# Patient Record
Sex: MALE | Race: WHITE | Employment: OTHER | URBAN - METROPOLITAN AREA
[De-identification: names, ages, dates, MRNs, and addresses within clinical notes are randomized per-mention and may not be internally consistent; named-entity substitution may affect disease eponyms.]

---

## 2017-03-17 ENCOUNTER — HOSPITAL ENCOUNTER (OUTPATIENT)
Dept: SURGERY | Age: 67
Discharge: HOME | End: 2017-03-17
Payer: MEDICARE

## 2017-03-17 VITALS
TEMPERATURE: 98.2 F | BODY MASS INDEX: 28.97 KG/M2 | RESPIRATION RATE: 19 BRPM | HEART RATE: 85 BPM | WEIGHT: 173.9 LBS | HEIGHT: 65 IN | SYSTOLIC BLOOD PRESSURE: 147 MMHG | DIASTOLIC BLOOD PRESSURE: 89 MMHG | OXYGEN SATURATION: 97 %

## 2017-03-17 LAB — HGB BLD-MCNC: 15.9 G/DL (ref 13.6–17.2)

## 2017-03-17 PROCEDURE — 85018 HEMOGLOBIN: CPT | Performed by: ANESTHESIOLOGY

## 2017-03-17 NOTE — PERIOP NOTES
Recent Results (from the past 12 hour(s))   HEMOGLOBIN    Collection Time: 03/17/17  1:00 PM   Result Value Ref Range    HGB 15.9 13.6 - 17.2 g/dL    reviewed

## 2017-03-17 NOTE — PERIOP NOTES
Patient verified name, , and surgery as listed in [facility]. TYPE  CASE:2            Orders: received. Labs per surgeon:  None ordered    Labs per anesthesia protocol: HGB. T/S Order signed and held for PREOP   EKG  :  None needed     BLOOD CONSENT ON CHART     Pt medications obtained  PT MEMORY, med bottles NOT visualized. Requested pt to validate each medication, dose and frequency while here today. Instructed patient to continue previous medications as prescribed prior to surgery and  to take the following medications the day of surgery according to anesthesia guidelines with a small sip of water : FLONASE IF NEEDED        Continue all previous medications unless otherwise directed. Instructed patient to hold  the following medications prior to surgery: NONE    Patient instructed on the following and verbalized understanding:  Arrive at [facility] entrance, time of arrival to be called the day before by 1700. Responsible adult must drive patient to and from hospital, stay during surgery and 24 hours postoperatively. Npo after midnight including gum, mints and ice chips. Use hibiclens in the shower the night before and the morning of surgery. Leave all valuables at home. Instructed on no jewelry or body piercings on the dos. Bring insurance card and ID. No perfumes, oil, powder, colognes, makeup or  lotions on the skin. Patient verbalized understanding of all instructions and provided all medical/health information to the best of their ability.

## 2017-03-21 ENCOUNTER — ANESTHESIA EVENT (OUTPATIENT)
Dept: SURGERY | Age: 67
End: 2017-03-21
Payer: MEDICARE

## 2017-03-22 ENCOUNTER — SURGERY (OUTPATIENT)
Age: 67
End: 2017-03-22

## 2017-03-22 ENCOUNTER — HOSPITAL ENCOUNTER (OUTPATIENT)
Age: 67
Discharge: HOME | End: 2017-03-26
Attending: UROLOGY | Admitting: UROLOGY
Payer: MEDICARE

## 2017-03-22 ENCOUNTER — ANESTHESIA (OUTPATIENT)
Dept: SURGERY | Age: 67
End: 2017-03-22
Payer: MEDICARE

## 2017-03-22 PROBLEM — N40.0 BPH (BENIGN PROSTATIC HYPERPLASIA): Status: ACTIVE | Noted: 2017-03-22

## 2017-03-22 LAB
ABO + RH BLD: NORMAL
BLOOD GROUP ANTIBODIES SERPL: NORMAL
SPECIMEN EXP DATE BLD: NORMAL

## 2017-03-22 PROCEDURE — 77030010545: Performed by: UROLOGY

## 2017-03-22 PROCEDURE — 77030005546 HC CATH URETH FOL 3W BARD -A: Performed by: UROLOGY

## 2017-03-22 PROCEDURE — 88305 TISSUE EXAM BY PATHOLOGIST: CPT | Performed by: UROLOGY

## 2017-03-22 PROCEDURE — 74011250636 HC RX REV CODE- 250/636

## 2017-03-22 PROCEDURE — 74011250637 HC RX REV CODE- 250/637: Performed by: ANESTHESIOLOGY

## 2017-03-22 PROCEDURE — 77030019940 HC BLNKT HYPOTHRM STRY -B: Performed by: ANESTHESIOLOGY

## 2017-03-22 PROCEDURE — 74011250636 HC RX REV CODE- 250/636: Performed by: ANESTHESIOLOGY

## 2017-03-22 PROCEDURE — 65270000029 HC RM PRIVATE

## 2017-03-22 PROCEDURE — 77030022427 HC ELECTRD RESCTCS CT STOR -C: Performed by: UROLOGY

## 2017-03-22 PROCEDURE — 77030005206: Performed by: UROLOGY

## 2017-03-22 PROCEDURE — 76060000034 HC ANESTHESIA 1.5 TO 2 HR: Performed by: UROLOGY

## 2017-03-22 PROCEDURE — 77030018836 HC SOL IRR NACL ICUM -A

## 2017-03-22 PROCEDURE — 74011250637 HC RX REV CODE- 250/637: Performed by: UROLOGY

## 2017-03-22 PROCEDURE — 82355 CALCULUS ANALYSIS QUAL: CPT | Performed by: UROLOGY

## 2017-03-22 PROCEDURE — 77030018846 HC SOL IRR STRL H20 ICUM -A: Performed by: UROLOGY

## 2017-03-22 PROCEDURE — 77030019949 HC FBR LSR HOLM OCOA -G: Performed by: UROLOGY

## 2017-03-22 PROCEDURE — 76210000016 HC OR PH I REC 1 TO 1.5 HR: Performed by: UROLOGY

## 2017-03-22 PROCEDURE — 77030018830 HC SOL IRR GLYC ICUM-A: Performed by: UROLOGY

## 2017-03-22 PROCEDURE — 77030019927 HC TBNG IRR CYSTO BAXT -A: Performed by: UROLOGY

## 2017-03-22 PROCEDURE — 74011250636 HC RX REV CODE- 250/636: Performed by: UROLOGY

## 2017-03-22 PROCEDURE — 77030020143 HC AIRWY LARYN INTUB CGAS -A: Performed by: ANESTHESIOLOGY

## 2017-03-22 PROCEDURE — 86850 RBC ANTIBODY SCREEN: CPT | Performed by: ANESTHESIOLOGY

## 2017-03-22 PROCEDURE — 76010000162 HC OR TIME 1.5 TO 2 HR INTENSV-TIER 1: Performed by: UROLOGY

## 2017-03-22 PROCEDURE — 74011000258 HC RX REV CODE- 258: Performed by: UROLOGY

## 2017-03-22 PROCEDURE — 74011000250 HC RX REV CODE- 250

## 2017-03-22 PROCEDURE — 77030011640 HC PAD GRND REM COVD -A: Performed by: UROLOGY

## 2017-03-22 PROCEDURE — 77030032490 HC SLV COMPR SCD KNE COVD -B: Performed by: UROLOGY

## 2017-03-22 RX ORDER — ZOLPIDEM TARTRATE 5 MG/1
5 TABLET ORAL
Status: DISCONTINUED | OUTPATIENT
Start: 2017-03-22 | End: 2017-03-26 | Stop reason: HOSPADM

## 2017-03-22 RX ORDER — LORAZEPAM 2 MG/ML
1 INJECTION INTRAMUSCULAR
Status: DISCONTINUED | OUTPATIENT
Start: 2017-03-22 | End: 2017-03-26 | Stop reason: HOSPADM

## 2017-03-22 RX ORDER — DEXAMETHASONE SODIUM PHOSPHATE 4 MG/ML
INJECTION, SOLUTION INTRA-ARTICULAR; INTRALESIONAL; INTRAMUSCULAR; INTRAVENOUS; SOFT TISSUE AS NEEDED
Status: DISCONTINUED | OUTPATIENT
Start: 2017-03-22 | End: 2017-03-22 | Stop reason: HOSPADM

## 2017-03-22 RX ORDER — PROPOFOL 10 MG/ML
INJECTION, EMULSION INTRAVENOUS AS NEEDED
Status: DISCONTINUED | OUTPATIENT
Start: 2017-03-22 | End: 2017-03-22 | Stop reason: HOSPADM

## 2017-03-22 RX ORDER — FENTANYL CITRATE 50 UG/ML
100 INJECTION, SOLUTION INTRAMUSCULAR; INTRAVENOUS ONCE
Status: DISCONTINUED | OUTPATIENT
Start: 2017-03-22 | End: 2017-03-22 | Stop reason: HOSPADM

## 2017-03-22 RX ORDER — POTASSIUM CHLORIDE 750 MG/1
20 TABLET, EXTENDED RELEASE ORAL 2 TIMES DAILY
Status: DISCONTINUED | OUTPATIENT
Start: 2017-03-22 | End: 2017-03-26 | Stop reason: HOSPADM

## 2017-03-22 RX ORDER — ACETAMINOPHEN 325 MG/1
650 TABLET ORAL
Status: DISCONTINUED | OUTPATIENT
Start: 2017-03-22 | End: 2017-03-26 | Stop reason: HOSPADM

## 2017-03-22 RX ORDER — MIDAZOLAM HYDROCHLORIDE 1 MG/ML
2 INJECTION, SOLUTION INTRAMUSCULAR; INTRAVENOUS
Status: DISCONTINUED | OUTPATIENT
Start: 2017-03-22 | End: 2017-03-22 | Stop reason: HOSPADM

## 2017-03-22 RX ORDER — LIDOCAINE HYDROCHLORIDE 20 MG/ML
INJECTION, SOLUTION EPIDURAL; INFILTRATION; INTRACAUDAL; PERINEURAL AS NEEDED
Status: DISCONTINUED | OUTPATIENT
Start: 2017-03-22 | End: 2017-03-22 | Stop reason: HOSPADM

## 2017-03-22 RX ORDER — SODIUM CHLORIDE, SODIUM LACTATE, POTASSIUM CHLORIDE, CALCIUM CHLORIDE 600; 310; 30; 20 MG/100ML; MG/100ML; MG/100ML; MG/100ML
75 INJECTION, SOLUTION INTRAVENOUS CONTINUOUS
Status: DISCONTINUED | OUTPATIENT
Start: 2017-03-22 | End: 2017-03-25 | Stop reason: HOSPADM

## 2017-03-22 RX ORDER — FINASTERIDE 5 MG/1
5 TABLET, FILM COATED ORAL DAILY
Status: DISCONTINUED | OUTPATIENT
Start: 2017-03-23 | End: 2017-03-26 | Stop reason: HOSPADM

## 2017-03-22 RX ORDER — HYDROMORPHONE HYDROCHLORIDE 1 MG/ML
1 INJECTION, SOLUTION INTRAMUSCULAR; INTRAVENOUS; SUBCUTANEOUS
Status: DISCONTINUED | OUTPATIENT
Start: 2017-03-22 | End: 2017-03-26 | Stop reason: HOSPADM

## 2017-03-22 RX ORDER — SODIUM CHLORIDE 0.9 % (FLUSH) 0.9 %
5-10 SYRINGE (ML) INJECTION EVERY 8 HOURS
Status: DISCONTINUED | OUTPATIENT
Start: 2017-03-22 | End: 2017-03-26 | Stop reason: HOSPADM

## 2017-03-22 RX ORDER — FENTANYL CITRATE 50 UG/ML
INJECTION, SOLUTION INTRAMUSCULAR; INTRAVENOUS AS NEEDED
Status: DISCONTINUED | OUTPATIENT
Start: 2017-03-22 | End: 2017-03-22 | Stop reason: HOSPADM

## 2017-03-22 RX ORDER — SODIUM CHLORIDE 0.9 % (FLUSH) 0.9 %
5-10 SYRINGE (ML) INJECTION AS NEEDED
Status: DISCONTINUED | OUTPATIENT
Start: 2017-03-22 | End: 2017-03-26 | Stop reason: HOSPADM

## 2017-03-22 RX ORDER — OXYCODONE HYDROCHLORIDE 5 MG/1
5 TABLET ORAL
Status: DISCONTINUED | OUTPATIENT
Start: 2017-03-22 | End: 2017-03-25 | Stop reason: HOSPADM

## 2017-03-22 RX ORDER — HYDROCODONE BITARTRATE AND ACETAMINOPHEN 5; 325 MG/1; MG/1
2 TABLET ORAL AS NEEDED
Status: DISCONTINUED | OUTPATIENT
Start: 2017-03-22 | End: 2017-03-25 | Stop reason: ALTCHOICE

## 2017-03-22 RX ORDER — DEXTROSE MONOHYDRATE AND SODIUM CHLORIDE 5; .45 G/100ML; G/100ML
100 INJECTION, SOLUTION INTRAVENOUS CONTINUOUS
Status: DISCONTINUED | OUTPATIENT
Start: 2017-03-22 | End: 2017-03-23

## 2017-03-22 RX ORDER — NALOXONE HYDROCHLORIDE 0.4 MG/ML
0.4 INJECTION, SOLUTION INTRAMUSCULAR; INTRAVENOUS; SUBCUTANEOUS
Status: DISCONTINUED | OUTPATIENT
Start: 2017-03-22 | End: 2017-03-26 | Stop reason: HOSPADM

## 2017-03-22 RX ORDER — HYDROCODONE BITARTRATE AND ACETAMINOPHEN 10; 325 MG/1; MG/1
1 TABLET ORAL
Status: DISCONTINUED | OUTPATIENT
Start: 2017-03-22 | End: 2017-03-26 | Stop reason: HOSPADM

## 2017-03-22 RX ORDER — FAMOTIDINE 20 MG/1
20 TABLET, FILM COATED ORAL ONCE
Status: COMPLETED | OUTPATIENT
Start: 2017-03-22 | End: 2017-03-22

## 2017-03-22 RX ORDER — HYDROMORPHONE HYDROCHLORIDE 2 MG/ML
0.5 INJECTION, SOLUTION INTRAMUSCULAR; INTRAVENOUS; SUBCUTANEOUS
Status: DISCONTINUED | OUTPATIENT
Start: 2017-03-22 | End: 2017-03-25 | Stop reason: HOSPADM

## 2017-03-22 RX ORDER — ONDANSETRON 2 MG/ML
INJECTION INTRAMUSCULAR; INTRAVENOUS AS NEEDED
Status: DISCONTINUED | OUTPATIENT
Start: 2017-03-22 | End: 2017-03-22 | Stop reason: HOSPADM

## 2017-03-22 RX ORDER — ATROPA BELLADONNA AND OPIUM 16.2; 6 MG/1; MG/1
1 SUPPOSITORY RECTAL
Status: DISCONTINUED | OUTPATIENT
Start: 2017-03-22 | End: 2017-03-26 | Stop reason: HOSPADM

## 2017-03-22 RX ORDER — CITALOPRAM 20 MG/1
20 TABLET, FILM COATED ORAL EVERY EVENING
Status: DISCONTINUED | OUTPATIENT
Start: 2017-03-22 | End: 2017-03-26 | Stop reason: HOSPADM

## 2017-03-22 RX ORDER — DIPHENHYDRAMINE HYDROCHLORIDE 50 MG/ML
12.5 INJECTION, SOLUTION INTRAMUSCULAR; INTRAVENOUS
Status: DISCONTINUED | OUTPATIENT
Start: 2017-03-22 | End: 2017-03-26 | Stop reason: HOSPADM

## 2017-03-22 RX ORDER — CIPROFLOXACIN 500 MG/1
500 TABLET ORAL EVERY 12 HOURS
Status: DISCONTINUED | OUTPATIENT
Start: 2017-03-22 | End: 2017-03-26 | Stop reason: HOSPADM

## 2017-03-22 RX ORDER — LIDOCAINE HYDROCHLORIDE 10 MG/ML
0.1 INJECTION INFILTRATION; PERINEURAL AS NEEDED
Status: DISCONTINUED | OUTPATIENT
Start: 2017-03-22 | End: 2017-03-22 | Stop reason: HOSPADM

## 2017-03-22 RX ORDER — CEFAZOLIN SODIUM IN 0.9 % NACL 2 G/50 ML
2 INTRAVENOUS SOLUTION, PIGGYBACK (ML) INTRAVENOUS ONCE
Status: COMPLETED | OUTPATIENT
Start: 2017-03-22 | End: 2017-03-22

## 2017-03-22 RX ORDER — FLUTICASONE PROPIONATE 50 MCG
2 SPRAY, SUSPENSION (ML) NASAL DAILY
Status: DISCONTINUED | OUTPATIENT
Start: 2017-03-23 | End: 2017-03-26 | Stop reason: HOSPADM

## 2017-03-22 RX ORDER — MIDAZOLAM HYDROCHLORIDE 1 MG/ML
5 INJECTION, SOLUTION INTRAMUSCULAR; INTRAVENOUS ONCE
Status: DISCONTINUED | OUTPATIENT
Start: 2017-03-22 | End: 2017-03-22 | Stop reason: HOSPADM

## 2017-03-22 RX ORDER — ONDANSETRON 2 MG/ML
4 INJECTION INTRAMUSCULAR; INTRAVENOUS
Status: DISCONTINUED | OUTPATIENT
Start: 2017-03-22 | End: 2017-03-26 | Stop reason: HOSPADM

## 2017-03-22 RX ORDER — ATORVASTATIN CALCIUM 10 MG/1
10 TABLET, FILM COATED ORAL
Status: DISCONTINUED | OUTPATIENT
Start: 2017-03-22 | End: 2017-03-26 | Stop reason: HOSPADM

## 2017-03-22 RX ORDER — SODIUM CHLORIDE, SODIUM LACTATE, POTASSIUM CHLORIDE, CALCIUM CHLORIDE 600; 310; 30; 20 MG/100ML; MG/100ML; MG/100ML; MG/100ML
75 INJECTION, SOLUTION INTRAVENOUS CONTINUOUS
Status: DISCONTINUED | OUTPATIENT
Start: 2017-03-22 | End: 2017-03-22 | Stop reason: HOSPADM

## 2017-03-22 RX ADMIN — HYDROMORPHONE HYDROCHLORIDE 0.5 MG: 2 INJECTION, SOLUTION INTRAMUSCULAR; INTRAVENOUS; SUBCUTANEOUS at 16:00

## 2017-03-22 RX ADMIN — DEXTROSE MONOHYDRATE AND SODIUM CHLORIDE 100 ML/HR: 5; .45 INJECTION, SOLUTION INTRAVENOUS at 17:23

## 2017-03-22 RX ADMIN — HYDROCODONE BITARTRATE AND ACETAMINOPHEN 2 TABLET: 5; 325 TABLET ORAL at 17:26

## 2017-03-22 RX ADMIN — POTASSIUM CHLORIDE 20 MEQ: 10 TABLET, EXTENDED RELEASE ORAL at 17:25

## 2017-03-22 RX ADMIN — FAMOTIDINE 20 MG: 20 TABLET, FILM COATED ORAL at 12:20

## 2017-03-22 RX ADMIN — MIDAZOLAM HYDROCHLORIDE 2 MG: 1 INJECTION, SOLUTION INTRAMUSCULAR; INTRAVENOUS at 12:43

## 2017-03-22 RX ADMIN — LIDOCAINE HYDROCHLORIDE 100 MG: 20 INJECTION, SOLUTION EPIDURAL; INFILTRATION; INTRACAUDAL; PERINEURAL at 13:37

## 2017-03-22 RX ADMIN — PROPOFOL 200 MG: 10 INJECTION, EMULSION INTRAVENOUS at 13:37

## 2017-03-22 RX ADMIN — DEXAMETHASONE SODIUM PHOSPHATE 4 MG: 4 INJECTION, SOLUTION INTRA-ARTICULAR; INTRALESIONAL; INTRAMUSCULAR; INTRAVENOUS; SOFT TISSUE at 14:04

## 2017-03-22 RX ADMIN — ONDANSETRON 4 MG: 2 INJECTION INTRAMUSCULAR; INTRAVENOUS at 14:06

## 2017-03-22 RX ADMIN — HYDROMORPHONE HYDROCHLORIDE 0.5 MG: 2 INJECTION, SOLUTION INTRAMUSCULAR; INTRAVENOUS; SUBCUTANEOUS at 15:53

## 2017-03-22 RX ADMIN — FENTANYL CITRATE 50 MCG: 50 INJECTION, SOLUTION INTRAMUSCULAR; INTRAVENOUS at 14:11

## 2017-03-22 RX ADMIN — FENTANYL CITRATE 50 MCG: 50 INJECTION, SOLUTION INTRAMUSCULAR; INTRAVENOUS at 13:35

## 2017-03-22 RX ADMIN — SODIUM CHLORIDE, SODIUM LACTATE, POTASSIUM CHLORIDE, AND CALCIUM CHLORIDE 75 ML/HR: 600; 310; 30; 20 INJECTION, SOLUTION INTRAVENOUS at 12:18

## 2017-03-22 RX ADMIN — CEFAZOLIN 2 G: 1 INJECTION, POWDER, FOR SOLUTION INTRAMUSCULAR; INTRAVENOUS; PARENTERAL at 13:30

## 2017-03-22 RX ADMIN — CITALOPRAM HYDROBROMIDE 20 MG: 20 TABLET ORAL at 17:25

## 2017-03-22 RX ADMIN — ATORVASTATIN CALCIUM 10 MG: 10 TABLET, FILM COATED ORAL at 23:53

## 2017-03-22 RX ADMIN — CIPROFLOXACIN HYDROCHLORIDE 500 MG: 500 TABLET, FILM COATED ORAL at 23:53

## 2017-03-22 RX ADMIN — SODIUM CHLORIDE, SODIUM LACTATE, POTASSIUM CHLORIDE, AND CALCIUM CHLORIDE: 600; 310; 30; 20 INJECTION, SOLUTION INTRAVENOUS at 14:30

## 2017-03-22 NOTE — PROGRESS NOTES
Pt admitted to room 614 from out pt PACU. A/O x4, family at bedside. Cosby patent, draining,  gtt slow, urine peach. Oriented to room and call light system, instructed to call with needs, verbalized understanding. Admission assessment and database complete upon arrival to floor.

## 2017-03-22 NOTE — PROGRESS NOTES
TRANSFER - IN REPORT:    Verbal report received from PACU RN on Tangela Flores  being received from out pt PACU for routine progression of care      Report consisted of patients Situation, Background, Assessment and   Recommendations(SBAR). Information from the following report(s) Procedure Summary was reviewed with the receiving nurse. Opportunity for questions and clarification was provided. Assessment completed upon patients arrival to unit and care assumed.

## 2017-03-22 NOTE — BRIEF OP NOTE
BRIEF OPERATIVE NOTE    Date of Procedure: 3/22/2017   Preoperative Diagnosis: Bladder stone [N21.0]  Benign prostatic hyperplasia with urinary obstruction [N40.1, N13.8]  Postoperative Diagnosis: Bladder stone [N21.0]  Benign prostatic hyperplasia with urinary obstruction [N40.1, N13.8]    Procedure(s):  CYSTOSCOPY TRANSURETHRAL RESECTION OF PROSTATE  CYSTO LITHOLAPAXY  Surgeon(s) and Role:      Arnold Acevedo MD - Primary            Surgical Staff:  Circ-1: Vargas Erwin RN  Event Time In   Incision Start 1351   Incision Close 1453     Anesthesia: General   Estimated Blood Loss: 50 cc  Specimens:   ID Type Source Tests Collected by Time Destination   1 : Prostate tissue Preservative   Charly Bell MD 3/22/2017 1436 Pathology      Findings: See dictated note   Complications: None  Implants: None

## 2017-03-22 NOTE — IP AVS SNAPSHOT
Kasia Davalos 
 
 
 [address] 
[phone] Patient: Norman Garg MRN: [id number] MEZ:[id number] You are allergic to the following Allergen Reactions Pcn (Penicillins) Rash Recent Documentation Weight BMI Smoking Status 78.5 kg 28.79 kg/m2 Never Smoker Emergency Contacts Name Discharge Info Relation Home Work Mobile Dipak Benítez  Child [2]   [phone] About your hospitalization You were admitted on:  March 22, 2017 You last received care in the:  [facility] MED SURG You were discharged on:  March 26, 2017 Unit phone number:  [phone] Why you were hospitalized Your primary diagnosis was:  Not on File Your diagnoses also included:  Bph (Benign Prostatic Hyperplasia) Providers Seen During Your Hospitalizations Provider Role Specialty Primary office phone Hermilo Montelongo MD Attending Provider Urology [phone] Your Primary Care Physician (PCP) Primary Care Physician Office Phone Office Fax Wu Gany [phone] Follow-up Information Follow up With Details Comments Contact Info Minus MD Aissatou   [address] [facility] [address] [phone] Hermilo Montelongo MD  Office closed on weekends. Dr. Heather Arroyo office will call you to schedule follow up appointment. Thank you. [address] [address] 
[phone] Current Discharge Medication List  
  
START taking these medications Dose & Instructions Dispensing Information Comments Morning Noon Evening Bedtime HYDROcodone-acetaminophen  mg tablet Commonly known as:  Martha Fothergill Your last dose was: Your next dose is:    
   
   
 Dose:  1 Tab Take 1 Tab by mouth every four (4) hours as needed for Pain. Max Daily Amount: 6 Tabs. Quantity:  20 Tab Refills:  0 phenazopyridine 200 mg tablet Commonly known as:  PYRIDIUM Your last dose was: Your next dose is:    
   
   
 Dose:  200 mg Take 1 Tab by mouth three (3) times daily as needed for Pain (use for burning on urination. Will turn urine orange.) for up to 3 days. Quantity:  15 Tab Refills:  2 CONTINUE these medications which have CHANGED Dose & Instructions Dispensing Information Comments Morning Noon Evening Bedtime  
 finasteride 5 mg tablet Commonly known as:  PROSCAR What changed:  when to take this Your last dose was: Your next dose is:    
   
   
 Dose:  5 mg Take 1 Tab by mouth daily. Quantity:  30 Tab Refills:  11 CONTINUE these medications which have NOT CHANGED Dose & Instructions Dispensing Information Comments Morning Noon Evening Bedtime  
 atorvastatin 10 mg tablet Commonly known as:  LIPITOR Your last dose was: Your next dose is:    
   
   
 Dose:  10 mg Take 10 mg by mouth nightly. Refills:  0  
     
   
   
   
  
 citalopram 20 mg tablet Commonly known as:  Katalina Harris Your last dose was: Your next dose is:    
   
   
 Dose:  20 mg Take 20 mg by mouth every evening. Refills:  0  
     
   
   
   
  
 fluticasone 50 mcg/actuation nasal spray Commonly known as:  Sebas Garcia Your last dose was: Your next dose is:    
   
   
 Dose:  2 Spray 2 Sprays by Nasal route as needed. Refills:  0  
     
   
   
   
  
 potassium chloride 20 mEq tablet Commonly known as:  K-BENY GIBBSOR-SANDER Your last dose was: Your next dose is:    
   
   
 Dose:  20 mEq Take 20 mEq by mouth two (2) times a day. Refills:  0 STOP taking these medications   
 tamsulosin 0.4 mg capsule Commonly known as:  FLOMAX Where to Get Your Medications Information on where to get these meds will be given to you by the nurse or doctor. ! Ask your nurse or doctor about these medications HYDROcodone-acetaminophen  mg tablet  
 phenazopyridine 200 mg tablet Discharge Instructions Benign Prostatic Hyperplasia: Care Instructions Your Care Instructions Benign prostatic hyperplasia, or BPH, is an enlarged prostate gland. The prostate is a small gland that makes some of the fluid in semen. Prostate enlargement happens to almost all men as they age. It is usually not serious. BPH does not cause prostate cancer. As the prostate gets bigger, it may partly block the flow of urine. You may have a hard time getting a urine stream started or completely stopped. BPH can cause dribbling. You may have a weak urine stream, or you may have to urinate more often than you used to, especially at night. Most men find these problems easy to manage. You do not need treatment unless your symptoms bother you a lot or you have other problems, such as bladder infections or stones. In these cases, medicines may help. Surgery is not needed unless the urine flow is blocked or the symptoms do not get better with medicine. Follow-up care is a key part of your treatment and safety. Be sure to make and go to all appointments, and call your doctor if you are having problems. It's also a good idea to know your test results and keep a list of the medicines you take. How can you care for yourself at home? · Take plenty of time to urinate. Try to relax. · Try \"double voiding. \" Urinate as much you can, relax for a few moments, and then try to urinate again. · Sit on the toilet to urinate. · Read or think of other things while you are waiting. · Turn on a faucet, or try to picture running water. Some men find that this helps get their urine flowing.  
· If dribbling is a problem, wash your penis daily to avoid skin irritation and infection. · Avoid caffeine and alcohol. These drinks will increase how often you need to urinate. Spread your fluid intake throughout the day. If the urge to urinate often wakes you at night, limit your fluid intake in the evening. Urinate right before you go to bed. · Many over-the-counter cold and allergy medicines can make the symptoms of BPH worse. Avoid antihistamines, decongestants, and allergy pills, if you can. Read the warnings on the package. · If you take any prescription medicines, especially tranquilizers or antidepressants, ask your doctor or pharmacist whether they can cause urination problems. There may be other medicines you can use that do not cause urinary problems. · Be safe with medicines. Take your medicines exactly as prescribed. Call your doctor if you think you are having a problem with your medicine. When should you call for help? Call your doctor now or seek immediate medical care if: 
· You cannot urinate at all. · You have symptoms of a urinary infection. For example: ¨ You have blood or pus in your urine. ¨ You have pain in your back just below your rib cage. This is called flank pain. ¨ You have a fever, chills, or body aches. ¨ It hurts to urinate. ¨ You have groin or belly pain. Watch closely for changes in your health, and be sure to contact your doctor if: 
· It hurts when you ejaculate. · Your urinary problems get a lot worse or bother you a lot. Where can you learn more? Go to http://nelida-nkechi.info/. Enter K427 in the search box to learn more about \"Benign Prostatic Hyperplasia: Care Instructions. \" Current as of: May 24, 2016 Content Version: 11.1 © 0252-1062 [employer]. Care instructions adapted under license by [employer] (which disclaims liability or warranty for this information).  If you have questions about a medical condition or this instruction, always ask your healthcare professional. [address] any warranty or liability for your use of this information. Transurethral Resection of the Prostate (TURP): Before Your Surgery What is transurethral resection of the prostate? Transurethral resection of the prostate (TURP) is surgery to take out a part of your prostate gland. It is done when the prostate gets too large. The prostate gland is a small organ just below a man's bladder. It makes most of the fluid in semen. It surrounds the urethra, the tube that carries urine from the bladder out of the body through the penis. Your doctor will give you medicine to make you sleep or feel relaxed. If you are awake during the surgery, you will get medicine to numb you from the chest down. You will not feel pain. The doctor puts a thin, lighted tube into your urethra. This is called a resectoscope or scope. It goes in through the opening in your penis. Then the doctor puts small surgical tools through the scope. These are used to remove the part of the prostate that is blocking urine flow. When the doctor is finished, he or she takes out the scope. This surgery may make it easier for you to urinate. You may have better control when you start and stop your urine stream. And you may feel like you get more relief when you urinate. Most men go home from the hospital 1 or 2 days after surgery. You may be able to go back to work or most of your usual routine in 1 to 3 weeks. But for about 6 weeks, you will need to avoid heavy lifting and activities that might put extra pressure on your bladder. Follow-up care is a key part of your treatment and safety. Be sure to make and go to all appointments, and call your doctor if you are having problems. It's also a good idea to know your test results and keep a list of the medicines you take. What happens before surgery? Surgery can be stressful. This information will help you understand what you can expect. And it will help you safely prepare for your surgery. Preparing for surgery · Understand exactly what surgery is planned, along with the risks, benefits, and other options. · Tell your doctors ALL the medicines, vitamins, supplements, and herbal remedies you take. Some of these can increase the risk of bleeding or interact with anesthesia. · If you take blood thinners, such as warfarin (Coumadin), clopidogrel (Plavix), or aspirin, be sure to talk to your doctor. He or she will tell you if you should stop taking these medicines before your surgery. Make sure that you understand exactly what your doctor wants you to do. · Your doctor will tell you which medicines to take or stop before your surgery. You may need to stop taking certain medicines a week or more before surgery. So talk to your doctor as soon as you can. · If you have an advance directive, let your doctor know. It may include a living will and a durable power of  for health care. Bring a copy to the hospital. If you don't have one, you may want to prepare one. It lets your doctor and loved ones know your health care wishes. Doctors advise that everyone prepare these papers before any type of surgery or procedure. · You may need to empty your bowels with an enema or laxative. Your doctor will tell you how to do this. What happens on the day of surgery? · Follow the instructions exactly about when to stop eating and drinking. If you don't, your surgery may be canceled. If your doctor told you to take your medicines on the day of surgery, take them with only a sip of water. · Take a bath or shower before you come in for your surgery. Do not apply lotions, perfumes, deodorants, or nail polish. · Do not shave the surgical site yourself. · Take off all jewelry and piercings. And take out contact lenses, if you wear them. At the hospital or surgery center · Bring a picture ID. · The area for surgery is often marked to make sure there are no errors. · You will be kept comfortable and safe by your anesthesia provider. The anesthesia may make you sleep. Or it may just numb the area being worked on. · The surgery will take 1 to 2 hours. Going home · Be sure you have someone to drive you home. Anesthesia and pain medicine make it unsafe for you to drive. · You will be given more specific instructions about recovering from your surgery. They will cover things like diet, wound care, follow-up care, driving, and getting back to your normal routine. · You may go home with a urinary catheter in place. A urinary catheter is a flexible plastic tube used to drain urine from your bladder when you cannot urinate on your own. Your doctor will give you instructions about how to care for your catheter and when it can be removed. When should you call your doctor? · You have questions or concerns. · You don't understand how to prepare for your surgery. · You become ill before the surgery (such as fever, flu, or a cold). · You need to reschedule or have changed your mind about having the surgery. Where can you learn more? Go to http://nelida-nkechi.info/. Enter D783 in the search box to learn more about \"Transurethral Resection of the Prostate (TURP): Before Your Surgery. \" Current as of: May 24, 2016 Content Version: 11.1 © 1938-4485 [employer]. Care instructions adapted under license by [employer] (which disclaims liability or warranty for this information). If you have questions about a medical condition or this instruction, always ask your healthcare professional. [address] any warranty or liability for your use of this information. DISCHARGE SUMMARY from Nurse The following personal items are in your possession at time of discharge: Dental Appliances: None Visual Aid: Glasses, At bedside Home Medications: None Jewelry: None Clothing: Footwear, Pants, Shirt Other Valuables: None PATIENT INSTRUCTIONS: 
 
 
F-face looks uneven A-arms unable to move or move unevenly S-speech slurred or non-existent T-time-call 911 as soon as signs and symptoms begin-DO NOT go Back to bed or wait to see if you get better-TIME IS BRAIN. Warning Signs of HEART ATTACK Call 911 if you have these symptoms: 
? Chest discomfort. Most heart attacks involve discomfort in the center of the chest that lasts more than a few minutes, or that goes away and comes back. It can feel like uncomfortable pressure, squeezing, fullness, or pain. ? Discomfort in other areas of the upper body. Symptoms can include pain or discomfort in one or both arms, the back, neck, jaw, or stomach. ? Shortness of breath with or without chest discomfort. ? Other signs may include breaking out in a cold sweat, nausea, or lightheadedness. Don't wait more than five minutes to call [address]! Fast action can save your life. Calling 911 is almost always the fastest way to get lifesaving treatment. Emergency Medical Services staff can begin treatment when they arrive  up to an hour sooner than if someone gets to the hospital by car. The discharge information has been reviewed with the patient. The patient verbalized understanding. Discharge medications reviewed with the patient and appropriate educational materials and side effects teaching were provided. Discharge Orders None Introducing [facility]! Tyrone Lynch introduces [facility] [facility] patient portal. Now you can access parts of your medical record, email your doctor's office, and request medication refills online. 1. In your internet browser, go to [email or web address] 2. Click on the First Time User? Click Here link in the Sign In box. You will see the New Member Sign Up page. 3. Enter your [facility] Access Code exactly as it appears below. You will not need to use this code after youve completed the sign-up process. If you do not sign up before the expiration date, you must request a new code. · [facility] Access Code: [email or web address] Expires: 5/14/2017 12:53 PM 
 
4. Enter the last four digits of your Social Security Number (xxxx) and Date of Birth (mm/dd/yyyy) as indicated and click Submit. You will be taken to the next sign-up page. 5. Create a [facility] ID. This will be your [facility] login ID and cannot be changed, so think of one that is secure and easy to remember. 6. Create a [facility] password. You can change your password at any time. 7. Enter your Password Reset Question and Answer. This can be used at a later time if you forget your password. 8. Enter your e-mail address. You will receive e-mail notification when new information is available in [address] 9. Click Sign Up. You can now view and download portions of your medical record. 10. Click the Download Summary menu link to download a portable copy of your medical information. If you have questions, please visit the Frequently Asked Questions section of the [employer] [facility] [facility] website. Remember, [facility] is NOT to be used for urgent needs. For medical emergencies, dial 911. Now available from your iPhone and Android! General Information Please provide this summary of care documentation to your next provider. Patient Signature:  ____________________________________________________________ Date:  ____________________________________________________________  
  
Rocio Dayton Provider Signature:  ____________________________________________________________ Date:  ____________________________________________________________

## 2017-03-22 NOTE — ANESTHESIA POSTPROCEDURE EVALUATION
Post-Anesthesia Evaluation and Assessment    Patient: Jacque Friday MRN: [id number]  SSN: [id number]    YOB: 1950  Age: 77 y.o. Sex: male       Cardiovascular Function/Vital Signs  Visit Vitals    /81    Pulse 78    Temp 36.6 °C (97.8 °F)    Resp 16    Wt 78.5 kg (173 lb)    SpO2 96%    BMI 28.79 kg/m2       Patient is status post general anesthesia for Procedure(s):  CYSTOSCOPY TRANSURETHRAL RESECTION OF PROSTATE  CYSTO LITHOLAPAXY. Nausea/Vomiting: None    Postoperative hydration reviewed and adequate. Pain:  Pain Scale 1: Numeric (0 - 10) (03/22/17 1221)  Pain Intensity 1: 0 (03/22/17 1221)   Managed    Neurological Status:   Neuro (WDL): Within Defined Limits (03/22/17 1226)   At baseline    Mental Status and Level of Consciousness: Arousable    Pulmonary Status:   O2 Device: Nasal cannula (03/22/17 1520)   Adequate oxygenation and airway patent    Complications related to anesthesia: None    Post-anesthesia assessment completed.  No concerns    Signed By: Amanda Katz MD     March 22, 2017

## 2017-03-22 NOTE — ANESTHESIA PREPROCEDURE EVALUATION
Anesthetic History   No history of anesthetic complications            Review of Systems / Medical History  Patient summary reviewed and pertinent labs reviewed    Pulmonary  Within defined limits                 Neuro/Psych   Within defined limits           Cardiovascular  Within defined limits                Exercise tolerance: >4 METS     GI/Hepatic/Renal           PUD     Endo/Other  Within defined limits           Other Findings            Physical Exam    Airway  Mallampati: II  TM Distance: 4 - 6 cm  Neck ROM: normal range of motion   Mouth opening: Normal     Cardiovascular  Regular rate and rhythm,  S1 and S2 normal,  no murmur, click, rub, or gallop             Dental  No notable dental hx       Pulmonary  Breath sounds clear to auscultation               Abdominal  GI exam deferred       Other Findings            Anesthetic Plan    ASA: 1  Anesthesia type: general          Induction: Intravenous  Anesthetic plan and risks discussed with: Patient and Spouse

## 2017-03-23 LAB
ANION GAP BLD CALC-SCNC: 7 MMOL/L (ref 7–16)
BUN SERPL-MCNC: 14 MG/DL (ref 8–23)
CALCIUM SERPL-MCNC: 8.1 MG/DL (ref 8.3–10.4)
CHLORIDE SERPL-SCNC: 106 MMOL/L (ref 98–107)
CO2 SERPL-SCNC: 27 MMOL/L (ref 21–32)
CREAT SERPL-MCNC: 1.09 MG/DL (ref 0.8–1.5)
GLUCOSE BLD STRIP.AUTO-MCNC: 117 MG/DL (ref 65–100)
GLUCOSE BLD STRIP.AUTO-MCNC: 121 MG/DL (ref 65–100)
GLUCOSE BLD STRIP.AUTO-MCNC: 128 MG/DL (ref 65–100)
GLUCOSE BLD STRIP.AUTO-MCNC: 133 MG/DL (ref 65–100)
GLUCOSE SERPL-MCNC: 133 MG/DL (ref 65–100)
HCT VFR BLD AUTO: 37.9 % (ref 41.1–50.3)
HGB BLD-MCNC: 13.4 G/DL (ref 13.6–17.2)
POTASSIUM SERPL-SCNC: 4 MMOL/L (ref 3.5–5.1)
SODIUM SERPL-SCNC: 140 MMOL/L (ref 136–145)

## 2017-03-23 PROCEDURE — 74011250637 HC RX REV CODE- 250/637: Performed by: UROLOGY

## 2017-03-23 PROCEDURE — 74011250636 HC RX REV CODE- 250/636: Performed by: UROLOGY

## 2017-03-23 PROCEDURE — 74011000258 HC RX REV CODE- 258: Performed by: UROLOGY

## 2017-03-23 PROCEDURE — 36415 COLL VENOUS BLD VENIPUNCTURE: CPT | Performed by: UROLOGY

## 2017-03-23 PROCEDURE — 82962 GLUCOSE BLOOD TEST: CPT

## 2017-03-23 PROCEDURE — 77030018836 HC SOL IRR NACL ICUM -A

## 2017-03-23 PROCEDURE — 80048 BASIC METABOLIC PNL TOTAL CA: CPT | Performed by: UROLOGY

## 2017-03-23 PROCEDURE — 74011250637 HC RX REV CODE- 250/637: Performed by: ANESTHESIOLOGY

## 2017-03-23 PROCEDURE — 85018 HEMOGLOBIN: CPT | Performed by: UROLOGY

## 2017-03-23 RX ADMIN — POTASSIUM CHLORIDE 20 MEQ: 10 TABLET, EXTENDED RELEASE ORAL at 09:24

## 2017-03-23 RX ADMIN — FLUTICASONE PROPIONATE 2 SPRAY: 50 SPRAY, METERED NASAL at 09:00

## 2017-03-23 RX ADMIN — CIPROFLOXACIN HYDROCHLORIDE 500 MG: 500 TABLET, FILM COATED ORAL at 09:23

## 2017-03-23 RX ADMIN — Medication 10 ML: at 05:49

## 2017-03-23 RX ADMIN — Medication 10 ML: at 23:46

## 2017-03-23 RX ADMIN — CIPROFLOXACIN HYDROCHLORIDE 500 MG: 500 TABLET, FILM COATED ORAL at 23:44

## 2017-03-23 RX ADMIN — HYDROCODONE BITARTRATE AND ACETAMINOPHEN 1 TABLET: 10; 325 TABLET ORAL at 19:56

## 2017-03-23 RX ADMIN — FINASTERIDE 5 MG: 5 TABLET, FILM COATED ORAL at 09:24

## 2017-03-23 RX ADMIN — DEXTROSE MONOHYDRATE AND SODIUM CHLORIDE 100 ML/HR: 5; .45 INJECTION, SOLUTION INTRAVENOUS at 11:59

## 2017-03-23 RX ADMIN — HYDROCODONE BITARTRATE AND ACETAMINOPHEN 1 TABLET: 10; 325 TABLET ORAL at 11:54

## 2017-03-23 RX ADMIN — POTASSIUM CHLORIDE 20 MEQ: 10 TABLET, EXTENDED RELEASE ORAL at 17:54

## 2017-03-23 RX ADMIN — HYDROCODONE BITARTRATE AND ACETAMINOPHEN 2 TABLET: 5; 325 TABLET ORAL at 03:11

## 2017-03-23 RX ADMIN — HYDROMORPHONE HYDROCHLORIDE 1 MG: 1 INJECTION, SOLUTION INTRAMUSCULAR; INTRAVENOUS; SUBCUTANEOUS at 00:38

## 2017-03-23 RX ADMIN — DEXTROSE MONOHYDRATE AND SODIUM CHLORIDE 100 ML/HR: 5; .45 INJECTION, SOLUTION INTRAVENOUS at 02:10

## 2017-03-23 RX ADMIN — ATORVASTATIN CALCIUM 10 MG: 10 TABLET, FILM COATED ORAL at 23:44

## 2017-03-23 RX ADMIN — Medication 10 ML: at 14:00

## 2017-03-23 RX ADMIN — CITALOPRAM HYDROBROMIDE 20 MG: 20 TABLET ORAL at 17:54

## 2017-03-23 NOTE — PROGRESS NOTES
END OF SHIFT NOTE:    INTAKE/OUTPUT  03/22 0701 - 03/23 0700  In: 22298 [P.O.:240; I.V.:2257]  Out: [id number]   Voiding: NO  Catheter: YES  Color: red  Drain:              DIET  regular    Flatus: Patient does have flatus present. Stool:  0 occurrences. Characteristics:       Ambulating  Yes    Emesis: 0 occurrences.     Characteristics:          VITAL SIGNS  Patient Vitals for the past 12 hrs:   Temp Pulse Resp BP SpO2   03/23/17 1914 98 °F (36.7 °C) 72 18 121/78 94 %   03/23/17 1617 98 °F (36.7 °C) 67 18 113/64 97 %   03/23/17 1153 98 °F (36.7 °C) 66 18 112/68 94 %       Pain Assessment  Pain Intensity 1: 0 (03/23/17 1254)  Pain Location 1: Abdomen  Pain Intervention(s) 1: Medication (see MAR)  Patient Stated Pain Goal: 0            Urmila Cabrera RN

## 2017-03-23 NOTE — PROGRESS NOTES
Urology Progress Note    Subjective:     Daily Progress Note: 3/23/2017 12:04 PM    Greer Aldridge is doing excellent. He reports pain is well controlled. He has no complaints. He is tolerating a solid diet and ambulating without assistance. Objective:     Visit Vitals    /68    Pulse 66    Temp 98 °F (36.7 °C)    Resp 18    Wt 173 lb (78.5 kg)    SpO2 94%    BMI 28.79 kg/m2        Temp (24hrs), Av °F (36.7 °C), Min:97.4 °F (36.3 °C), Max:98.8 °F (37.1 °C)      Intake and Output:   1901 -  0700  In: 84102 [P.O.:240; I.V.:2257]  Out: 20220    0701 -  1900  In: 9065 [P.O.:120]  Out: 4000     Physical Exam: No distress. Abdomen:Soft, non-tender, active bowel sounds,Bladder is not palpable. Urine is clear on minimal CBI      Lab/Data Review: Hgb 13.4, Creatinine 1.09      Assessment/Plan:     Active Problems:    BPH (benign prostatic hyperplasia) (3/22/2017)        Plan:  Stop CBI. Hep well IV. Up walking.       Signed By: Farley Babinski, MD                         2017

## 2017-03-23 NOTE — OP NOTES
[facility]   OPERATIVE REPORT       Name:  Manpreet Sheth   MR#:  [id number]   :  1950   Account #:  [de-identified]   Date of Adm:  2017       DATE OF SURGERY: 2017. PREOPERATIVE DIAGNOSES:   1. BPH.   2. Bladder stone. POSTOPERATIVE DIAGNOSES:   1. BPH.   2. Bladder stone. NAME OF PROCEDURE:   1. Cystolithalopaxy. 2. Transurethral resection of the prostate. SURGEON: Yumiko De La Cruz. Ritu Craft MD.    ANESTHESIA: General.    BLOOD LOSS: Approximately 50 mL. SPECIMENS: Approximately 15 g of prostate tissue for permanent   path. DRAINS: A 24-Guyanese 3-way Cosby catheter to continuous bladder   irrigation, 30 mL in the balloon. NARRATIVE: The patient was taken to the OR and after adequate   general anesthesia was achieved, he was placed in dorsal   lithotomy position and prepped and draped in the usual sterile   fashion for a cystoscopy case. The patient was then gently   sounded with Alcon Bigger sounds from 16-Guyanese up to 28-Guyanese. A   HoLAP sheath with obturator was then advanced through the   urethra and into the bladder. Obturator was removed with clear   return of urine, a 30 degree lens with bridge was then inserted. I immediately encountered approximately 2 to 2.5 cm bladder   stone laying in the dependent portion of the bladder. The   bladder mucosa showed no lesions that were suggestive of   transitional cell carcinoma. There was moderate trabeculation   with a few scattered cellules, but no obvious diverticula. The   prostate was only moderately enlarged but he did have a median   lobe that this probably obstructing a 1000 micron holmium laser   fiber was then inserted at a setting of 1.2 joules and 10 Hz. Laser lithotripsy was performed. The stone was reduced to very   small fragments, which were subsequently irrigated out through   the sheath.  Once this process was complete, the HoLAP sheath was   removed and a standard resectoscope sheath, 26-Northern Irish in size,   was then advanced through the urethra and into the bladder. Obturator was removed with clear return of irrigant. The   [place] working element with standard loop was inserted. Transurethral resection of the prostate was then performed. I   resected the median lobe first. I then resected from 12 o'clock   to 3 o'clock and then 12 o'clock to 9 o'clock. Posterior aspect   of the prostate was then resected as well, staying proximal to   the verumontanum. All prostate chips were irrigated out. Both   ureteral orifices were positively identified at the end of the   case. Bovie electrocautery was used to establish hemostasis,   which was quite good. All prostate chips were irrigated out and   sent for permanent path. All instruments were then removed. A   24-Northern Irish 3-way Cosby catheter was advanced through the urethra   and into the bladder. Once there was clear return of irrigant,   it was inflated with 30 mL of sterile water. This was placed to   continuous bladder irrigation with clear return. The patient was   then taken down out of dorsal lithotomy position, awakened,   extubated, and taken from the OR without any further incident or   complaint.         MD Marguerite Narayanan / Cristofer.Scott   D:  03/22/2017   16:21   T:  03/23/2017   09:20   Job #:  [id number]

## 2017-03-24 PROCEDURE — 77030018836 HC SOL IRR NACL ICUM -A

## 2017-03-24 PROCEDURE — 74011250637 HC RX REV CODE- 250/637: Performed by: UROLOGY

## 2017-03-24 PROCEDURE — 51798 US URINE CAPACITY MEASURE: CPT

## 2017-03-24 PROCEDURE — 77030018846 HC SOL IRR STRL H20 ICUM -A

## 2017-03-24 RX ORDER — DOCUSATE SODIUM 100 MG/1
100 CAPSULE, LIQUID FILLED ORAL
Status: DISCONTINUED | OUTPATIENT
Start: 2017-03-24 | End: 2017-03-26 | Stop reason: HOSPADM

## 2017-03-24 RX ADMIN — HYDROCODONE BITARTRATE AND ACETAMINOPHEN 1 TABLET: 10; 325 TABLET ORAL at 07:57

## 2017-03-24 RX ADMIN — ATROPA BELLADONNA AND OPIUM 1 SUPPOSITORY: 16.2; 6 SUPPOSITORY RECTAL at 14:01

## 2017-03-24 RX ADMIN — POTASSIUM CHLORIDE 20 MEQ: 10 TABLET, EXTENDED RELEASE ORAL at 17:12

## 2017-03-24 RX ADMIN — HYDROCODONE BITARTRATE AND ACETAMINOPHEN 1 TABLET: 10; 325 TABLET ORAL at 00:45

## 2017-03-24 RX ADMIN — DOCUSATE SODIUM 100 MG: 100 CAPSULE, LIQUID FILLED ORAL at 08:49

## 2017-03-24 RX ADMIN — FINASTERIDE 5 MG: 5 TABLET, FILM COATED ORAL at 07:57

## 2017-03-24 RX ADMIN — CIPROFLOXACIN HYDROCHLORIDE 500 MG: 500 TABLET, FILM COATED ORAL at 08:00

## 2017-03-24 RX ADMIN — CIPROFLOXACIN HYDROCHLORIDE 500 MG: 500 TABLET, FILM COATED ORAL at 20:55

## 2017-03-24 RX ADMIN — CITALOPRAM HYDROBROMIDE 20 MG: 20 TABLET ORAL at 17:12

## 2017-03-24 RX ADMIN — POTASSIUM CHLORIDE 20 MEQ: 10 TABLET, EXTENDED RELEASE ORAL at 07:57

## 2017-03-24 RX ADMIN — ATORVASTATIN CALCIUM 10 MG: 10 TABLET, FILM COATED ORAL at 20:55

## 2017-03-24 RX ADMIN — Medication 10 ML: at 22:00

## 2017-03-24 NOTE — PROGRESS NOTES
Pt c/o spasms and severe pain this afternoon. \"I can't pee. \"    Pt also told RN he was having \"puddles leaking from catheter. \"    B&O sup adm, see MAR. Bladder scan revealing approximately 460 ml. Lower abd tight and distended. Cosby hand irrigated per standing orders. Several small and moderated blood clots returned in syringe and in tubing once reconnected to drainage bag. Cosby balloon deflated and tubing advanced, urine return adequate at this time. CBI turned back on briefly, will continue to monitor urine. Pt reports relief after episode.

## 2017-03-24 NOTE — PROGRESS NOTES
Care Management Interventions  PCP Verified by CM: Yes  Transition of Care Consult (CM Consult): Discharge Planning  Current Support Network: Lives with Spouse, Own Home  Confirm Follow Up Transport: Family  Plan discussed with Pt/Family/Caregiver: Yes  Freedom of Choice Offered: Yes  Discharge Location  Discharge Placement: Home     Met with patient at the bedside. His daughter was there as well. Patient is alert and oriented times four. Independent at baseline. He is active. Patient presented with Code 40 letter and Juany Milner 130 letter. Answered questions and his signed copy in on the paper chart. No home needs. DC tomorrow hopefully. Continue CBI. Jazmine Magdaleno

## 2017-03-25 PROCEDURE — 74011250637 HC RX REV CODE- 250/637: Performed by: UROLOGY

## 2017-03-25 PROCEDURE — 51798 US URINE CAPACITY MEASURE: CPT

## 2017-03-25 PROCEDURE — 65270000029 HC RM PRIVATE

## 2017-03-25 RX ADMIN — CIPROFLOXACIN HYDROCHLORIDE 500 MG: 500 TABLET, FILM COATED ORAL at 08:18

## 2017-03-25 RX ADMIN — FINASTERIDE 5 MG: 5 TABLET, FILM COATED ORAL at 08:18

## 2017-03-25 RX ADMIN — CITALOPRAM HYDROBROMIDE 20 MG: 20 TABLET ORAL at 17:40

## 2017-03-25 RX ADMIN — POTASSIUM CHLORIDE 20 MEQ: 10 TABLET, EXTENDED RELEASE ORAL at 17:40

## 2017-03-25 RX ADMIN — DOCUSATE SODIUM 100 MG: 100 CAPSULE, LIQUID FILLED ORAL at 08:18

## 2017-03-25 RX ADMIN — POTASSIUM CHLORIDE 20 MEQ: 10 TABLET, EXTENDED RELEASE ORAL at 08:18

## 2017-03-25 RX ADMIN — Medication 10 ML: at 06:00

## 2017-03-25 NOTE — PROGRESS NOTES
Pt has had to have duarte flushed 4 times tonight. Large blood clots noted when duarte stared to drain agin. Pt stated he would feel pressure before duarte was flushed.   Call light at hand wife at bedside

## 2017-03-25 NOTE — PROGRESS NOTES
irrigant has been running slow this shift. Blood clots continue to be expelled in tubing, but pt did not c/o pain this shift or require hand irrigation. Urine pink, clots remain present.

## 2017-03-26 VITALS
BODY MASS INDEX: 28.79 KG/M2 | SYSTOLIC BLOOD PRESSURE: 117 MMHG | WEIGHT: 173 LBS | RESPIRATION RATE: 16 BRPM | TEMPERATURE: 98.3 F | HEART RATE: 68 BPM | DIASTOLIC BLOOD PRESSURE: 77 MMHG | OXYGEN SATURATION: 96 %

## 2017-03-26 PROCEDURE — 77030018836 HC SOL IRR NACL ICUM -A

## 2017-03-26 PROCEDURE — 74011250637 HC RX REV CODE- 250/637: Performed by: UROLOGY

## 2017-03-26 RX ORDER — HYDROCODONE BITARTRATE AND ACETAMINOPHEN 10; 325 MG/1; MG/1
1 TABLET ORAL
Qty: 20 TAB | Refills: 0 | Status: SHIPPED | OUTPATIENT
Start: 2017-03-26 | End: 2017-04-18

## 2017-03-26 RX ORDER — PHENAZOPYRIDINE HYDROCHLORIDE 200 MG/1
200 TABLET, FILM COATED ORAL
Qty: 15 TAB | Refills: 2 | Status: SHIPPED | OUTPATIENT
Start: 2017-03-26 | End: 2017-03-29

## 2017-03-26 RX ADMIN — Medication 10 ML: at 09:26

## 2017-03-26 RX ADMIN — CIPROFLOXACIN HYDROCHLORIDE 500 MG: 500 TABLET, FILM COATED ORAL at 00:01

## 2017-03-26 RX ADMIN — ATORVASTATIN CALCIUM 10 MG: 10 TABLET, FILM COATED ORAL at 00:01

## 2017-03-26 RX ADMIN — POTASSIUM CHLORIDE 20 MEQ: 10 TABLET, EXTENDED RELEASE ORAL at 09:26

## 2017-03-26 RX ADMIN — FINASTERIDE 5 MG: 5 TABLET, FILM COATED ORAL at 09:26

## 2017-03-26 RX ADMIN — CIPROFLOXACIN HYDROCHLORIDE 500 MG: 500 TABLET, FILM COATED ORAL at 09:26

## 2017-03-26 RX ADMIN — FLUTICASONE PROPIONATE 2 SPRAY: 50 SPRAY, METERED NASAL at 09:00

## 2017-03-26 NOTE — PROGRESS NOTES
END OF SHIFT NOTE:    INTAKE/OUTPUT  03/25 0701 - 03/26 0700  In: 33017   Out: 53567 [Urine:29919]  Voiding: YES  Catheter: NO  Color: clear  Drain:              DIET  clear    Flatus: Patient does have flatus present. Stool:  1 occurrences. Characteristics:  Stool Assessment  Stool Color: Brown  Stool Appearance: Formed, Soft  Stool Amount: Medium (per pt)  Stool Source/Status: Rectum    Ambulating  Yes    Emesis: 0 occurrences.     Characteristics:          VITAL SIGNS  Patient Vitals for the past 12 hrs:   Temp Pulse Resp BP SpO2   03/26/17 1136 98.3 °F (36.8 °C) 68 16 117/77 96 %   03/26/17 0805 98.2 °F (36.8 °C) 67 18 119/81 93 %   03/26/17 0417 98.4 °F (36.9 °C) 75 20 116/76 94 %       Pain Assessment  Pain Intensity 1: 0 (03/26/17 0700)  Pain Location 1: Abdomen  Pain Intervention(s) 1: Medication (see MAR)  Patient Stated Pain Goal: 0            Danilo Argueta RN

## 2017-03-26 NOTE — DISCHARGE INSTRUCTIONS
Benign Prostatic Hyperplasia: Care Instructions  Your Care Instructions    Benign prostatic hyperplasia, or BPH, is an enlarged prostate gland. The prostate is a small gland that makes some of the fluid in semen. Prostate enlargement happens to almost all men as they age. It is usually not serious. BPH does not cause prostate cancer. As the prostate gets bigger, it may partly block the flow of urine. You may have a hard time getting a urine stream started or completely stopped. BPH can cause dribbling. You may have a weak urine stream, or you may have to urinate more often than you used to, especially at night. Most men find these problems easy to manage. You do not need treatment unless your symptoms bother you a lot or you have other problems, such as bladder infections or stones. In these cases, medicines may help. Surgery is not needed unless the urine flow is blocked or the symptoms do not get better with medicine. Follow-up care is a key part of your treatment and safety. Be sure to make and go to all appointments, and call your doctor if you are having problems. It's also a good idea to know your test results and keep a list of the medicines you take. How can you care for yourself at home? · Take plenty of time to urinate. Try to relax. · Try \"double voiding. \" Urinate as much you can, relax for a few moments, and then try to urinate again. · Sit on the toilet to urinate. · Read or think of other things while you are waiting. · Turn on a faucet, or try to picture running water. Some men find that this helps get their urine flowing. · If dribbling is a problem, wash your penis daily to avoid skin irritation and infection. · Avoid caffeine and alcohol. These drinks will increase how often you need to urinate. Spread your fluid intake throughout the day. If the urge to urinate often wakes you at night, limit your fluid intake in the evening. Urinate right before you go to bed.   · Many over-the-counter cold and allergy medicines can make the symptoms of BPH worse. Avoid antihistamines, decongestants, and allergy pills, if you can. Read the warnings on the package. · If you take any prescription medicines, especially tranquilizers or antidepressants, ask your doctor or pharmacist whether they can cause urination problems. There may be other medicines you can use that do not cause urinary problems. · Be safe with medicines. Take your medicines exactly as prescribed. Call your doctor if you think you are having a problem with your medicine. When should you call for help? Call your doctor now or seek immediate medical care if:  · You cannot urinate at all. · You have symptoms of a urinary infection. For example:  ¨ You have blood or pus in your urine. ¨ You have pain in your back just below your rib cage. This is called flank pain. ¨ You have a fever, chills, or body aches. ¨ It hurts to urinate. ¨ You have groin or belly pain. Watch closely for changes in your health, and be sure to contact your doctor if:  · It hurts when you ejaculate. · Your urinary problems get a lot worse or bother you a lot. Where can you learn more? Go to http://nelida-nkechi.info/. Enter K474 in the search box to learn more about \"Benign Prostatic Hyperplasia: Care Instructions. \"  Current as of: May 24, 2016  Content Version: 11.1  © 1234-7886 [employer]. Care instructions adapted under license by [employer] (which disclaims liability or warranty for this information). If you have questions about a medical condition or this instruction, always ask your healthcare professional. [address] any warranty or liability for your use of this information. Transurethral Resection of the Prostate (TURP): Before Your Surgery  What is transurethral resection of the prostate?     Transurethral resection of the prostate (TURP) is surgery to take out a part of your prostate gland. It is done when the prostate gets too large. The prostate gland is a small organ just below a man's bladder. It makes most of the fluid in semen. It surrounds the urethra, the tube that carries urine from the bladder out of the body through the penis. Your doctor will give you medicine to make you sleep or feel relaxed. If you are awake during the surgery, you will get medicine to numb you from the chest down. You will not feel pain. The doctor puts a thin, lighted tube into your urethra. This is called a resectoscope or scope. It goes in through the opening in your penis. Then the doctor puts small surgical tools through the scope. These are used to remove the part of the prostate that is blocking urine flow. When the doctor is finished, he or she takes out the scope. This surgery may make it easier for you to urinate. You may have better control when you start and stop your urine stream. And you may feel like you get more relief when you urinate. Most men go home from the hospital 1 or 2 days after surgery. You may be able to go back to work or most of your usual routine in 1 to 3 weeks. But for about 6 weeks, you will need to avoid heavy lifting and activities that might put extra pressure on your bladder. Follow-up care is a key part of your treatment and safety. Be sure to make and go to all appointments, and call your doctor if you are having problems. It's also a good idea to know your test results and keep a list of the medicines you take. What happens before surgery? Surgery can be stressful. This information will help you understand what you can expect. And it will help you safely prepare for your surgery. Preparing for surgery  · Understand exactly what surgery is planned, along with the risks, benefits, and other options. · Tell your doctors ALL the medicines, vitamins, supplements, and herbal remedies you take.  Some of these can increase the risk of bleeding or interact with anesthesia. · If you take blood thinners, such as warfarin (Coumadin), clopidogrel (Plavix), or aspirin, be sure to talk to your doctor. He or she will tell you if you should stop taking these medicines before your surgery. Make sure that you understand exactly what your doctor wants you to do. · Your doctor will tell you which medicines to take or stop before your surgery. You may need to stop taking certain medicines a week or more before surgery. So talk to your doctor as soon as you can. · If you have an advance directive, let your doctor know. It may include a living will and a durable power of  for health care. Bring a copy to the hospital. If you don't have one, you may want to prepare one. It lets your doctor and loved ones know your health care wishes. Doctors advise that everyone prepare these papers before any type of surgery or procedure. · You may need to empty your bowels with an enema or laxative. Your doctor will tell you how to do this. What happens on the day of surgery? · Follow the instructions exactly about when to stop eating and drinking. If you don't, your surgery may be canceled. If your doctor told you to take your medicines on the day of surgery, take them with only a sip of water. · Take a bath or shower before you come in for your surgery. Do not apply lotions, perfumes, deodorants, or nail polish. · Do not shave the surgical site yourself. · Take off all jewelry and piercings. And take out contact lenses, if you wear them. At the hospital or surgery center  · Bring a picture ID. · The area for surgery is often marked to make sure there are no errors. · You will be kept comfortable and safe by your anesthesia provider. The anesthesia may make you sleep. Or it may just numb the area being worked on. · The surgery will take 1 to 2 hours. Going home  · Be sure you have someone to drive you home.  Anesthesia and pain medicine make it unsafe for you to drive.  · You will be given more specific instructions about recovering from your surgery. They will cover things like diet, wound care, follow-up care, driving, and getting back to your normal routine. · You may go home with a urinary catheter in place. A urinary catheter is a flexible plastic tube used to drain urine from your bladder when you cannot urinate on your own. Your doctor will give you instructions about how to care for your catheter and when it can be removed. When should you call your doctor? · You have questions or concerns. · You don't understand how to prepare for your surgery. · You become ill before the surgery (such as fever, flu, or a cold). · You need to reschedule or have changed your mind about having the surgery. Where can you learn more? Go to http://nelida-nkechi.info/. Enter B684 in the search box to learn more about \"Transurethral Resection of the Prostate (TURP): Before Your Surgery. \"  Current as of: May 24, 2016  Content Version: 11.1  © 2212-4512 [employer]. Care instructions adapted under license by [employer] (which disclaims liability or warranty for this information). If you have questions about a medical condition or this instruction, always ask your healthcare professional. [place] [address] any warranty or liability for your use of this information. DISCHARGE SUMMARY from Nurse    The following personal items are in your possession at time of discharge:    Dental Appliances: None  Visual Aid: Glasses, At bedside     Home Medications: None  Jewelry: None  Clothing:  Footwear, Pants, Shirt  Other Valuables: None             PATIENT INSTRUCTIONS:    After general anesthesia or intravenous sedation, for 24 hours or while taking prescription Narcotics:  · Limit your activities  · Do not drive and operate hazardous machinery  · Do not make important personal or business decisions  · Do  not drink alcoholic beverages  · If you have not urinated within 8 hours after discharge, please contact your surgeon on call. Report the following to your surgeon:  · Excessive pain, swelling, redness or odor of or around the surgical area  · Temperature over 100.5  · Nausea and vomiting lasting longer than 4 hours or if unable to take medications  · Any signs of decreased circulation or nerve impairment to extremity: change in color, persistent  numbness, tingling, coldness or increase pain  · Any questions        What to do at Home:  Recommended activity: No heavy lifting, Driving while taking pain medications, or Strenuous exercise until after follow up appointment. If you experience any of the following symptoms fever above 101.5, inability to urinate, excessive bleeding please follow up with Dr. Enriqueta Marshall or the ER. *  Please give a list of your current medications to your Primary Care Provider. *  Please update this list whenever your medications are discontinued, doses are      changed, or new medications (including over-the-counter products) are added. *  Please carry medication information at all times in case of emergency situations. These are general instructions for a healthy lifestyle:    No smoking/ No tobacco products/ Avoid exposure to second hand smoke    Surgeon General's Warning:  Quitting smoking now greatly reduces serious risk to your health. Obesity, smoking, and sedentary lifestyle greatly increases your risk for illness    A healthy diet, regular physical exercise & weight monitoring are important for maintaining a healthy lifestyle    You may be retaining fluid if you have a history of heart failure or if you experience any of the following symptoms:  Weight gain of 3 pounds or more overnight or 5 pounds in a week, increased swelling in our hands or feet or shortness of breath while lying flat in bed.   Please call your doctor as soon as you notice any of these symptoms; do not wait until your next office visit. Recognize signs and symptoms of STROKE:    F-face looks uneven    A-arms unable to move or move unevenly    S-speech slurred or non-existent    T-time-call 911 as soon as signs and symptoms begin-DO NOT go       Back to bed or wait to see if you get better-TIME IS BRAIN. Warning Signs of HEART ATTACK     Call 911 if you have these symptoms:   Chest discomfort. Most heart attacks involve discomfort in the center of the chest that lasts more than a few minutes, or that goes away and comes back. It can feel like uncomfortable pressure, squeezing, fullness, or pain.  Discomfort in other areas of the upper body. Symptoms can include pain or discomfort in one or both arms, the back, neck, jaw, or stomach.  Shortness of breath with or without chest discomfort.  Other signs may include breaking out in a cold sweat, nausea, or lightheadedness. Don't wait more than five minutes to call 911 - MINUTES MATTER! Fast action can save your life. Calling 911 is almost always the fastest way to get lifesaving treatment. Emergency Medical Services staff can begin treatment when they arrive -- up to an hour sooner than if someone gets to the hospital by car. The discharge information has been reviewed with the patient. The patient verbalized understanding. Discharge medications reviewed with the patient and appropriate educational materials and side effects teaching were provided.

## 2017-03-26 NOTE — PROGRESS NOTES
Urology Progress Note    Subjective:     Daily Progress Note: 3/26/2017 12:14 PM    Pt had a much better night last night. Objective:     Visit Vitals    /77 (BP 1 Location: Right arm, BP Patient Position: At rest)    Pulse 68    Temp 98.3 °F (36.8 °C)    Resp 16    Wt 173 lb (78.5 kg)    SpO2 96%    BMI 28.79 kg/m2        Temp (24hrs), Av.2 °F (36.8 °C), Min:98.1 °F (36.7 °C), Max:98.4 °F (36.9 °C)      Intake and Output:   1901 -  0700  In: 41748   Out: 51392 [Urine:84697]   0701 -  1900  In: 3000   Out: 4000 [Urine:4000]    Physical Exam: No distress. Abdomen:Soft, non-tender, active bowel sounds,Bladder is not palpable. Assessment/Plan:     Active Problems:    BPH (benign prostatic hyperplasia) (3/22/2017)        Plan:  Remove duarte. Home if he can void.     Signed By: Cecelia Escamilla MD                         2017

## 2017-04-13 NOTE — DISCHARGE SUMMARY
.  Discharge Summary     Patient: Art Ball MRN: [id number]  SSN: [id number]    YOB: 1950  Age: 77 y.o. Sex: male      Admit Date: 3/22/2017    Discharge Date: 3/26/2017    * Admission Diagnoses:  Bladder stone [N21.0]; Benign prostatic hyperplasia with uri*     * Discharge Diagnoses:   Hospital Problems as of 3/26/2017  Date Reviewed: 3/8/2017          Codes Class Noted - Resolved POA    BPH (benign prostatic hyperplasia) ICD-10-CM: N40.0  ICD-9-CM: 600.00  3/22/2017 - Present Unknown               * Procedures for this admission:   Procedure(s):  CYSTOSCOPY TRANSURETHRAL RESECTION OF PROSTATE  CYSTO LITHOLAPAXY      * Disposition: Home    * Discharged Condition: good    * Hospital Course: The patient presented with lower urinary tract symptoms and a bladder stone. He was admitted for same-day surgery on March 22, 2017 and underwent cystolitholapaxy and TURP. Postoperatively he did well but he did have persistently bloody urine that required him to stay on continuous bladder irrigation longer than usual.  I was able to remove his Cosby on postop day #4 and he subsequently voided without difficulty. Discharge Medications:   Discharge Medication List as of 3/26/2017  2:23 PM      START taking these medications    Details   HYDROcodone-acetaminophen (NORCO)  mg tablet Take 1 Tab by mouth every four (4) hours as needed for Pain. Max Daily Amount: 6 Tabs., Print, Disp-20 Tab, R-0      phenazopyridine (PYRIDIUM) 200 mg tablet Take 1 Tab by mouth three (3) times daily as needed for Pain (use for burning on urination. Will turn urine orange.) for up to 3 days. , Print, Disp-15 Tab, R-2         CONTINUE these medications which have NOT CHANGED    Details   finasteride (PROSCAR) 5 mg tablet Take 1 Tab by mouth daily. , Normal, Disp-30 Tab, R-11      atorvastatin (LIPITOR) 10 mg tablet Take 10 mg by mouth nightly., Historical Med      citalopram (CELEXA) 20 mg tablet Take 20 mg by mouth every evening., Historical Med      fluticasone (FLONASE) 50 mcg/actuation nasal spray 2 Sprays by Nasal route as needed., Historical Med      potassium chloride (K-DUR, KLOR-CON) 20 mEq tablet Take 20 mEq by mouth two (2) times a day., Historical Med         STOP taking these medications       tamsulosin (FLOMAX) 0.4 mg capsule Comments:   Reason for Stopping:                * Follow-up Care/Patient Instructions: Activity: Activity as tolerated  Diet: Regular Diet  Wound Care: None needed    Follow-up Information     Follow up With Details Comments [address] [facility] [address] [place] [address] Devora Read MD  Office closed on weekends. Dr. Rizwana Coyne office will call you to schedule follow up appointment. Thank you.  [address] [place] [id number] [phone]             Signed By: Ramoniat Hernandez MD     April 12, 2017

## 2022-06-27 ENCOUNTER — APPOINTMENT (RX ONLY)
Dept: URBAN - METROPOLITAN AREA CLINIC 329 | Facility: CLINIC | Age: 72
Setting detail: DERMATOLOGY
End: 2022-06-27

## 2022-06-27 DIAGNOSIS — L82.1 OTHER SEBORRHEIC KERATOSIS: ICD-10-CM

## 2022-06-27 DIAGNOSIS — R21 RASH AND OTHER NONSPECIFIC SKIN ERUPTION: ICD-10-CM | Status: INADEQUATELY CONTROLLED

## 2022-06-27 DIAGNOSIS — D22 MELANOCYTIC NEVI: ICD-10-CM

## 2022-06-27 PROBLEM — D22.5 MELANOCYTIC NEVI OF TRUNK: Status: ACTIVE | Noted: 2022-06-27

## 2022-06-27 PROCEDURE — 99203 OFFICE O/P NEW LOW 30 MIN: CPT

## 2022-06-27 PROCEDURE — ? COUNSELING

## 2022-06-27 PROCEDURE — ? PRESCRIPTION

## 2022-06-27 PROCEDURE — ? SUNSCREEN RECOMMENDATIONS

## 2022-06-27 PROCEDURE — ? PRESCRIPTION MEDICATION MANAGEMENT

## 2022-06-27 RX ORDER — TRIAMCINOLONE ACETONIDE 1 MG/G
CREAM TOPICAL BID
Qty: 80 | Refills: 2 | Status: ERX | COMMUNITY
Start: 2022-06-27

## 2022-06-27 RX ADMIN — TRIAMCINOLONE ACETONIDE: 1 CREAM TOPICAL at 00:00

## 2022-06-27 ASSESSMENT — LOCATION DETAILED DESCRIPTION DERM
LOCATION DETAILED: LEFT INFERIOR MEDIAL UPPER BACK
LOCATION DETAILED: STERNUM
LOCATION DETAILED: RIGHT SUPERIOR MEDIAL MIDBACK
LOCATION DETAILED: RIGHT SUPERIOR MEDIAL UPPER BACK
LOCATION DETAILED: EPIGASTRIC SKIN

## 2022-06-27 ASSESSMENT — LOCATION SIMPLE DESCRIPTION DERM
LOCATION SIMPLE: CHEST
LOCATION SIMPLE: RIGHT UPPER BACK
LOCATION SIMPLE: ABDOMEN
LOCATION SIMPLE: RIGHT LOWER BACK
LOCATION SIMPLE: LEFT UPPER BACK

## 2022-06-27 ASSESSMENT — LOCATION ZONE DERM: LOCATION ZONE: TRUNK

## 2023-05-17 ENCOUNTER — APPOINTMENT (RX ONLY)
Dept: URBAN - METROPOLITAN AREA CLINIC 329 | Facility: CLINIC | Age: 73
Setting detail: DERMATOLOGY
End: 2023-05-17

## 2023-05-17 DIAGNOSIS — L57.0 ACTINIC KERATOSIS: ICD-10-CM

## 2023-05-17 DIAGNOSIS — L81.4 OTHER MELANIN HYPERPIGMENTATION: ICD-10-CM

## 2023-05-17 DIAGNOSIS — L82.0 INFLAMED SEBORRHEIC KERATOSIS: ICD-10-CM

## 2023-05-17 PROCEDURE — 17000 DESTRUCT PREMALG LESION: CPT | Mod: 59

## 2023-05-17 PROCEDURE — ? SUNSCREEN RECOMMENDATIONS

## 2023-05-17 PROCEDURE — 17003 DESTRUCT PREMALG LES 2-14: CPT | Mod: 59

## 2023-05-17 PROCEDURE — ? LIQUID NITROGEN

## 2023-05-17 PROCEDURE — ? COUNSELING

## 2023-05-17 PROCEDURE — ? FULL BODY SKIN EXAM - DECLINED

## 2023-05-17 PROCEDURE — 99212 OFFICE O/P EST SF 10 MIN: CPT | Mod: 25

## 2023-05-17 PROCEDURE — 17110 DESTRUCTION B9 LES UP TO 14: CPT

## 2023-05-17 ASSESSMENT — LOCATION SIMPLE DESCRIPTION DERM
LOCATION SIMPLE: LEFT SCALP
LOCATION SIMPLE: RIGHT CHEEK
LOCATION SIMPLE: SCALP
LOCATION SIMPLE: RIGHT SCALP
LOCATION SIMPLE: LEFT CHEEK
LOCATION SIMPLE: POSTERIOR SCALP
LOCATION SIMPLE: LEFT FOREHEAD
LOCATION SIMPLE: RIGHT OCCIPITAL SCALP
LOCATION SIMPLE: RIGHT ZYGOMA
LOCATION SIMPLE: LEFT ZYGOMA

## 2023-05-17 ASSESSMENT — LOCATION DETAILED DESCRIPTION DERM
LOCATION DETAILED: RIGHT MEDIAL FRONTAL SCALP
LOCATION DETAILED: LEFT FOREHEAD
LOCATION DETAILED: LEFT SUPERIOR MEDIAL MALAR CHEEK
LOCATION DETAILED: RIGHT SUPERIOR MEDIAL MALAR CHEEK
LOCATION DETAILED: RIGHT SUPERIOR OCCIPITAL SCALP
LOCATION DETAILED: LEFT MEDIAL ZYGOMA
LOCATION DETAILED: LEFT MEDIAL FRONTAL SCALP
LOCATION DETAILED: POSTERIOR MID-PARIETAL SCALP
LOCATION DETAILED: LEFT CENTRAL ZYGOMA
LOCATION DETAILED: RIGHT CENTRAL ZYGOMA
LOCATION DETAILED: RIGHT SUPERIOR PARIETAL SCALP

## 2023-05-17 ASSESSMENT — LOCATION ZONE DERM
LOCATION ZONE: SCALP
LOCATION ZONE: FACE

## 2023-05-17 NOTE — PROCEDURE: LIQUID NITROGEN
Show Aperture Variable?: Yes
Render Post-Care Instructions In Note?: no
Spray Paint Text: The liquid nitrogen was applied to the skin utilizing a spray paint frosting technique.
Medical Necessity Clause: This procedure was medically necessary because the lesions that were treated were:
Consent: The patient's consent was obtained including but not limited to risks of crusting, scabbing, blistering, scarring, darker or lighter pigmentary change, recurrence, incomplete removal and infection.
Medical Necessity Information: It is in your best interest to select a reason for this procedure from the list below. All of these items fulfill various CMS LCD requirements except the new and changing color options.
Detail Level: Detailed
Post-Care Instructions: I reviewed with the patient in detail post-care instructions. Patient is to wear sunprotection, and avoid picking at any of the treated lesions. Pt may apply Vaseline to crusted or scabbing areas.
Number Of Freeze-Thaw Cycles: 2 freeze-thaw cycles
Duration Of Freeze Thaw-Cycle (Seconds): 0

## (undated) DEVICE — Y-TYPE TUR/BLADDER IRRIGATION SET, REGULATING CLAMP

## (undated) DEVICE — CATHETER URETH 24FR BLLN 30CC STD LTX 3 W TWO OPP DRNGE EYE

## (undated) DEVICE — REM POLYHESIVE ADULT PATIENT RETURN ELECTRODE: Brand: VALLEYLAB

## (undated) DEVICE — PACK PROCEDURE SURG TRANSURETHRAL RESECT OF PROST CDS

## (undated) DEVICE — Device

## (undated) DEVICE — ELECTRODE,CUTTING,STERILE.24FR: Brand: N.A.

## (undated) DEVICE — SYR IRR CATH TIP LR ADPT 70ML -- CONVERT TO ITEM 363120

## (undated) DEVICE — SOL IRR GLYC 1.5 % 3000ML --

## (undated) DEVICE — SOLUTION IV STRL H2O 500 ML AQUALITE POUR BTL

## (undated) DEVICE — CONTAINER SPEC FRMLN 120ML --

## (undated) DEVICE — KENDALL SCD EXPRESS SLEEVES, KNEE LENGTH, MEDIUM: Brand: KENDALL SCD

## (undated) DEVICE — BAG DRNGE 4000ML CONT IRRIG ROUNDED TEARDROP SHP DISP

## (undated) DEVICE — CYSTO/BLADDER IRRIGATION SET, REGULATING CLAMP

## (undated) DEVICE — DEVICE STBL AD TRICOT ANCHR PD FOR 3 W F CATH STATLOK

## (undated) DEVICE — TRAY PREP DRY W/ PREM GLV 2 APPL 6 SPNG 2 UNDPD 1 OVERWRAP